# Patient Record
Sex: FEMALE | Race: WHITE | NOT HISPANIC OR LATINO | Employment: OTHER | URBAN - METROPOLITAN AREA
[De-identification: names, ages, dates, MRNs, and addresses within clinical notes are randomized per-mention and may not be internally consistent; named-entity substitution may affect disease eponyms.]

---

## 2017-03-08 ENCOUNTER — TELEPHONE (OUTPATIENT)
Dept: FAMILY MEDICINE CLINIC | Facility: CLINIC | Age: 52
End: 2017-03-08

## 2021-08-24 ENCOUNTER — HOSPITAL ENCOUNTER (INPATIENT)
Facility: HOSPITAL | Age: 56
End: 2021-08-24
Attending: PHYSICAL MEDICINE & REHABILITATION | Admitting: PHYSICAL MEDICINE & REHABILITATION

## 2021-12-08 LAB — PAP RECOMMENDATION EXT: NORMAL

## 2021-12-09 LAB — HPV, HIGH-RISK: NEGATIVE

## 2023-01-05 PROBLEM — K75.81 LIVER CIRRHOSIS SECONDARY TO NASH (NONALCOHOLIC STEATOHEPATITIS): Status: ACTIVE | Noted: 2023-01-05

## 2023-01-05 PROBLEM — R22.41 LOCALIZED SWELLING OF RIGHT FOOT: Status: ACTIVE | Noted: 2023-01-05

## 2023-01-05 PROBLEM — K74.60 LIVER CIRRHOSIS SECONDARY TO NASH (NONALCOHOLIC STEATOHEPATITIS): Status: ACTIVE | Noted: 2023-01-05

## 2023-01-11 PROBLEM — E03.9 HYPOTHYROIDISM: Status: ACTIVE | Noted: 2023-01-11

## 2023-01-11 PROBLEM — F32.A DEPRESSION: Status: ACTIVE | Noted: 2023-01-11

## 2023-01-11 PROBLEM — K76.82 HEPATIC ENCEPHALOPATHY: Status: ACTIVE | Noted: 2023-01-11

## 2023-02-28 PROBLEM — I85.00 ESOPHAGEAL VARICES: Status: ACTIVE | Noted: 2019-10-15

## 2023-04-18 DIAGNOSIS — Z12.11 ENCOUNTER FOR COLORECTAL CANCER SCREENING: Primary | ICD-10-CM

## 2023-04-18 DIAGNOSIS — Z12.12 ENCOUNTER FOR COLORECTAL CANCER SCREENING: Primary | ICD-10-CM

## 2023-05-08 PROBLEM — D68.9 COAGULOPATHY: Status: ACTIVE | Noted: 2023-05-08

## 2023-05-08 PROBLEM — D72.810 LYMPHOPENIA: Status: ACTIVE | Noted: 2023-05-08

## 2023-05-08 PROBLEM — D69.6 THROMBOCYTOPENIA: Status: ACTIVE | Noted: 2023-05-08

## 2023-10-05 ENCOUNTER — PATIENT OUTREACH (OUTPATIENT)
Dept: ADMINISTRATIVE | Facility: HOSPITAL | Age: 58
End: 2023-10-05

## 2023-10-17 PROBLEM — R18.8 OTHER ASCITES: Status: ACTIVE | Noted: 2023-10-17

## 2023-10-17 PROBLEM — K72.90 DECOMPENSATED HEPATIC CIRRHOSIS: Status: ACTIVE | Noted: 2023-10-17

## 2023-10-17 PROBLEM — K74.60 DECOMPENSATED HEPATIC CIRRHOSIS: Status: ACTIVE | Noted: 2023-10-17

## 2023-10-18 PROBLEM — I81 PORTAL VEIN THROMBOSIS: Status: ACTIVE | Noted: 2023-10-18

## 2023-10-19 ENCOUNTER — E-CONSULT (OUTPATIENT)
Dept: GASTROENTEROLOGY | Facility: HOSPITAL | Age: 58
End: 2023-10-19
Payer: MEDICAID

## 2023-10-19 ENCOUNTER — TELEPHONE (OUTPATIENT)
Dept: TRANSPLANT | Facility: CLINIC | Age: 58
End: 2023-10-19
Payer: MEDICAID

## 2023-10-19 DIAGNOSIS — I81 PORTAL VEIN THROMBOSIS: Primary | ICD-10-CM

## 2023-10-19 PROCEDURE — 99451 PR INTERPROF, PHONE/INTERNET/EHR, CONSULT, >= 5MINS: ICD-10-PCS | Mod: ,,, | Performed by: INTERNAL MEDICINE

## 2023-10-19 PROCEDURE — 99451 NTRPROF PH1/NTRNET/EHR 5/>: CPT | Mod: ,,, | Performed by: INTERNAL MEDICINE

## 2023-10-19 NOTE — CONSULTS
[facility] GASTROENTEROLOGY  Response for E-Consult     Patient Name: Patricia Hutchinson  MRN: [id number]  Primary Care Provider: Khloe Kiser MD   Requesting Provider: Hanh Gamboa MD  E-Consult to Liver Transplant  Consult performed by: Brendan Fu MD  Consult ordered by: Hanh Gamboa MD          Recommendation: DALEY with MELD 19 and PVT    Please get CT liver with contrast as I will discuss with Transplant team in Radiology conference before moving with Transplant evaluation.        Contingency:     Total time of Consultation: 10 minute    I did not speak to the requesting provider verbally about this.     *This eConsult is based on the clinical data available to me and is furnished without benefit of a physical examination. The eConsult will need to be interpreted in light of any clinical issues or changes in patient status not available to me at the time of filing this eConsults. Significant changes in patient condition or level of acuity should result in immediate formal consultation and reevaluation. Please alert me if you have further questions.    Thank you for this eConsult referral.     Brendan Fu MD  [facility] GASTROENTEROLOGY

## 2023-10-19 NOTE — TELEPHONE ENCOUNTER
Referral received from Dr JED GARRIDO consult  Initial  referral from Hanh Gamboa MD  Patient with DALEY  MELD 20  ICD-10:  k74.60       Referred for liver transplant for  EVALUATION.    Referral completed and forwarded to [facility].          Insurance: Epic   PRIMARY:   ID:  Contact #     SECONDARY:   ID:  Contact #

## 2023-10-24 ENCOUNTER — TELEPHONE (OUTPATIENT)
Dept: TRANSPLANT | Facility: CLINIC | Age: 58
End: 2023-10-24
Payer: MEDICAID

## 2023-10-24 NOTE — TELEPHONE ENCOUNTER
"Patient: Patricia Hutchinson       MRN: [id number]      : 1965     Age: 58 y.o.  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    Clinical Summary: inpatient at [facility].   Decomp palumbo cirrhosis p/w worsening ascites. Refractory ascites, not a candidate for tips in the past given PVT.     Melds score of 22- higher risk for complications post tips. She has also had decompensation in the past with EV bleed, possible SBP, jaundice and mild HE. She also had a complicated course with portal vein thrombosis in  (not on AC).   in   p. She was seen at by [facility] and was told she has a portal vein thrombosis and is "too high risk"     Imaging to be reviewed: 10/20/2023  CT abd TP Epic  complete thrombosed portal and splenic veins    HCC Treatment History: na    Platelets:   Lab Results   Component Value Date/Time    PLT 42 (LL) 10/24/2023 02:10 AM     Creatinine:   Lab Results   Component Value Date/Time    CREATININE 0.8 10/24/2023 02:10 AM     Bilirubin:   Lab Results   Component Value Date/Time    BILITOT 2.6 (H) 10/24/2023 02:10 AM     AFP Last 3 each if available: No results found for: "AFP", "EXTAFP"    MELD: MELD 3.0: 20 at 10/24/2023  2:10 AM  MELD-Na: 18 at 10/24/2023  2:10 AM  Calculated from:  Serum Creatinine: 0.8 mg/dL (Using min of 1 mg/dL) at 10/24/2023  2:10 AM  Serum Sodium: 134 mmol/L at 10/24/2023  2:10 AM  Total Bilirubin: 2.6 mg/dL at 10/24/2023  2:10 AM  Serum Albumin: 2.4 g/dL at 10/24/2023  2:10 AM  INR(ratio): 1.69 at 10/24/2023  2:10 AM  Age at listing (hypothetical): 58 years  Sex: Female at 10/24/2023  2:10 AM      Plan:     Follow-up Provider: Mela   "
Submitted for review at liver conference  on 10/31/23.  
This document is complete and the patient is ready for discharge.

## 2023-10-30 NOTE — TELEPHONE ENCOUNTER
"Patient: Patricia Hutchinson       MRN: [id number]      : 1965     Age: 58 y.o.  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     Clinical Summary: inpatient at [facility].   Decomp palumbo cirrhosis p/w worsening ascites. Refractory ascites, not a candidate for tips in the past given PVT.     Melds score of 22- higher risk for complications post tips. She has also had decompensation in the past with EV bleed, possible SBP, jaundice and mild HE. She also had a complicated course with portal vein thrombosis in  (not on AC).   in   p. [facility] was [facility] at by [facility] and was told she has a portal vein thrombosis and is "too high risk"      Imaging to be reviewed: 10/20/2023  CT abd TP Epic  complete thrombosed portal and splenic veins     HCC Treatment History: na     Platelets:         Lab Results   Component Value Date/Time     PLT 42 (LL) 10/24/2023 02:10 AM      Creatinine:         Lab Results   Component Value Date/Time     CREATININE 0.8 10/24/2023 02:10 AM      Bilirubin:         Lab Results   Component Value Date/Time     BILITOT 2.6 (H) 10/24/2023 02:10 AM      AFP Last 3 each if available: No results found for: "AFP", "EXTAFP"     MELD: MELD 3.0: 20 at 10/24/2023  2:10 AM  MELD-Na: 18 at 10/24/2023  2:10 AM  Calculated from:  Serum Creatinine: 0.8 mg/dL (Using min of 1 mg/dL) at 10/24/2023  2:10 AM  Serum Sodium: 134 mmol/L at 10/24/2023  2:10 AM  Total Bilirubin: 2.6 mg/dL at 10/24/2023  2:10 AM  Serum Albumin: 2.4 g/dL at 10/24/2023  2:10 AM  INR(ratio): 1.69 at 10/24/2023  2:10 AM  Age at listing (hypothetical): 58 years  Sex: Female at 10/24/2023  2:10 AM      "   Plan: CT demonstrates thrombosis of the portal and splenic veins, which is new from 3/2023.       Discussion:Img dated back from March.. there is contrast in portal vein and in the interim she has completely thrombose her portal as well as her splenic.   There is no portal access that we can see that's patent in the liver .  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   New from March No recanalization  option completely clotted.   No anticoagulation , it increases the risk of bleeding complications.    Note forwarded to Swati for follow up.       Follow-up Provider: Mela

## 2023-10-31 ENCOUNTER — DOCUMENTATION ONLY (OUTPATIENT)
Dept: HEPATOLOGY | Facility: HOSPITAL | Age: 58
End: 2023-10-31
Payer: MEDICAID

## 2023-10-31 ENCOUNTER — CONFERENCE (OUTPATIENT)
Dept: TRANSPLANT | Facility: CLINIC | Age: 58
End: 2023-10-31
Payer: MEDICAID

## 2023-10-31 NOTE — PROGRESS NOTES
Case discussed In IR conference. Patient is not a candidate for Liver Transplant due to complex surgical anatomy and extensive thrombosis of Portal system. No plan to initial the transplant evaluation. Unfortunately patient need to continue LVP to control fluid accumulation

## 2023-11-06 ENCOUNTER — TELEPHONE (OUTPATIENT)
Dept: HEPATOLOGY | Facility: CLINIC | Age: 58
End: 2023-11-06
Payer: MEDICAID

## 2023-11-06 NOTE — TELEPHONE ENCOUNTER
Spoke with pt to confirm her appt with Dr Fu on Wed, 11/08/23 at the [facility].  Pt asked me if she had a co-pay and I explained that at the end of her completing her E-Check the system will let her know if she will have on or not.  Pt stated that it did and it's $140 and went on to say that she has not income at this time.    I told pt unfortunately I do not have control of that.  This will need to be addressed by the billing/finance department and I provided pt with their contact information [phone].    Pt understood verbally and said that she will contact them.

## 2023-11-07 PROBLEM — K76.0 NON-ALCOHOLIC FATTY LIVER DISEASE: Status: ACTIVE | Noted: 2023-11-07

## 2023-11-07 PROBLEM — K76.6 PORTAL HYPERTENSION: Status: ACTIVE | Noted: 2023-11-07

## 2023-11-07 PROBLEM — R18.8 CIRRHOSIS OF LIVER WITH ASCITES: Status: ACTIVE | Noted: 2023-01-05

## 2023-11-07 NOTE — PROGRESS NOTES
"    Patricia Hutchinson   is a 58 y.o.. I performed this consultation using real-time Telehealth tools, including a live video connection between my location and the patient's location. Prior to initiating the consultation, I obtained informed verbal consent to perform this consultation using Telehealth tools and answered all the questions about the Telehealth interaction.      Each patient to whom he or she provides medical services by telemedicine is:  (1) informed of the relationship between the physician and patient and the respective role of any other health care provider with respect to management of the patient; and (2) notified that he or she may decline to receive medical services by telemedicine and may withdraw from such care at any time.      Hepatology Note    PATIENT: Patricia Hutchinson  MRN: [id number]  DATE: 11/10/2023    Provider: Hepatologist - Dr Fu  Urgency of review: non-urgent  Referring provider: Dr. Brendan Fu    Diagnosis:   1. Cirrhosis of liver with ascites, unspecified hepatic cirrhosis type    2. Secondary esophageal varices with bleeding    3. Hepatic encephalopathy    4. Other ascites    5. Portal hypertension    6. Non-alcoholic fatty liver disease    7. Thrombocytopenia    8. Coagulopathy    9. Hypothyroidism, unspecified type        Chief complaint: No chief complaint on file.      Subjective:    Initial History: Patricia Hutchinson is a 58 y.o. female who was referred to Hepatology Clinic for consultation of DALEY cirrhosis        Patient has MASH cirrhosis decompensated p/w with EV bleed, possible SBP, jaundice and mild HE and PVT. She is not a candidate for TIPS in the past given PVT.  Her MELD score has been 20-22.      She also had a complicated course with portal vein thrombosis in 2021 (not on AC).  She was seen at by  transplant and was told she has a portal vein thrombosis and is "too high risk"     Patient was discussed in IR conference at [facility] and she was not a candidate for " Transplant due to compelx vascular anatomy    Plan: CT demonstrates thrombosis of the portal and splenic veins, which is new from 3/2023. Discussion: there is contrast in portal vein and in the interim she has completely thrombose her portal as well as her splenic.   There is no portal access that we can see that's patent in the liver .  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 No anticoagulation , it increases the risk of bleeding complications.    Patient was admitted last month at [facility] for ascites. EGD completed on 10/18 , 4 varices banded. US liver with doppler completed, showed portal vein with no flow, concerning for portal vein thrombosis     11/10/2023    She denies recent hematemesis, coffee ground emesis, melena, hematochezia, jaundice, confusion, LE edema, abdominal distension.        Prior Relevant History:    She  denies hepatotoxic medication    Review of systems:  A review of 12+ systems was conducted with pertinent positive and negative findings documented in HPI with all other systems reviewed and negative.      PFSH:  Past medical, family, and social history reviewed as documented in chart with pertinent positive medical, family, and social history detailed in HPI.    Past Medical History:   Past Medical History:   Diagnosis Date    Stroke     Unspecified cirrhosis of liver        Past Surgical HIstory:   Past Surgical History:   Procedure Laterality Date    BREAST BIOPSY Right      SECTION      HERNIA REPAIR         Family History:   Family History   Problem Relation Age of Onset    COPD Mother     Lung cancer Mother     Stroke Mother      She has no known family history of liver disease.     Social History:  reports that she has never smoked. She has never been exposed to tobacco smoke. She has never used smokeless tobacco. She reports that she does not currently use drugs. She reports that she does not drink alcohol.    She has no significant history of Alcohol     She denies  history of IV drug use/Tatto  She  denies high-risk sexual contacts, no raw seafood, no sick contacts      Allergies:  Review of patient's allergies indicates:   Allergen Reactions    Latex     Latex, natural rubber      Rash       Medications:  Current Outpatient Medications   Medication Sig Dispense Refill    citalopram (CELEXA) 20 MG tablet Take 1 tablet (20 mg total) by mouth once daily. 60 tablet 3    furosemide (LASIX) 80 MG tablet Take 1 tablet (80 mg total) by mouth once daily. 60 tablet 0    gabapentin (NEURONTIN) 300 MG capsule Take 1 capsule (300 mg total) by mouth 2 (two) times daily. 30 capsule 3    lactulose (CHRONULAC) 20 gram/30 mL Soln Take 30 mLs (20 g total) by mouth 3 (three) times daily. 2700 mL 1    levothyroxine (SYNTHROID, LEVOTHROID) 175 MCG tablet Take 1 tablet (175 mcg total) by mouth before breakfast. 30 tablet 11    pantoprazole (PROTONIX) 40 MG tablet Take 1 tablet (40 mg total) by mouth 2 (two) times daily before meals. for 14 days 28 tablet 0    promethazine (PHENERGAN) 25 MG tablet Take 0.5 tablets (12.5 mg total) by mouth every 8 (eight) hours as needed for Nausea. 60 tablet 0    spironolactone (ALDACTONE) 100 MG tablet Take 2 tablets (200 mg total) by mouth every morning. 60 tablet 0    sucralfate (CARAFATE) 100 mg/mL suspension Take 10 mLs (1 g total) by mouth 4 (four) times daily before meals and nightly. 414 mL 2     No current facility-administered medications for this visit.       Review of Systems   Constitutional:  Negative for fatigue, fever and unexpected weight change.   HENT:  Negative for ear pain, nosebleeds and trouble swallowing.    Eyes:  Negative for discharge and redness.   Respiratory:  Negative for cough and shortness of breath.    Cardiovascular:  Negative for palpitations and leg swelling.   Gastrointestinal:  Negative for abdominal distention, abdominal pain, diarrhea and vomiting.   Endocrine: Negative for cold intolerance and polyuria.   Genitourinary:   Negative for flank pain and hematuria.   Musculoskeletal:  Negative for back pain.   Skin:  Negative for pallor.   Neurological:  Negative for seizures and headaches.   Hematological:  Does not bruise/bleed easily.   Psychiatric/Behavioral:  Negative for confusion and hallucinations.        MELD 3.0: 21 at 11/7/2023  4:13 PM  MELD-Na: 20 at 11/7/2023  4:13 PM  Calculated from:  Serum Creatinine: 0.9 mg/dL (Using min of 1 mg/dL) at 11/7/2023  4:13 PM  Serum Sodium: 136 mmol/L at 11/7/2023  4:13 PM  Total Bilirubin: 4.5 mg/dL at 11/7/2023  4:13 PM  Serum Albumin: 3.4 g/dL at 11/7/2023  4:13 PM  INR(ratio): 1.9 at 11/7/2023  4:13 PM  Age at listing (hypothetical): 58 years  Sex: Female at 11/7/2023  4:13 PM       Objective:      Vitals: There were no vitals filed for this visit.    Physical Exam  Constitutional:       Appearance: Normal appearance.   HENT:      Head: Normocephalic and atraumatic.      Right Ear: External ear normal.      Left Ear: External ear normal.      Mouth/Throat:      Mouth: Mucous membranes are moist.   Eyes:      Extraocular Movements: Extraocular movements intact.      Pupils: Pupils are equal, round, and reactive to light.   Cardiovascular:      Rate and Rhythm: Normal rate.   Pulmonary:      Effort: Pulmonary effort is normal.   Abdominal:      General: There is no distension.      Palpations: There is no mass.      Tenderness: There is no abdominal tenderness.   Musculoskeletal:         General: Normal range of motion.      Cervical back: Normal range of motion.   Skin:     General: Skin is warm.   Neurological:      General: No focal deficit present.      Mental Status: She is alert and oriented to person, place, and time.         Laboratory Data:  Lab Visit on 11/07/2023   Component Date Value Ref Range Status    WBC 11/07/2023 5.71  3.90 - 12.70 K/uL Final    RBC 11/07/2023 3.72 (L)  4.00 - 5.40 M/uL Final    Hemoglobin 11/07/2023 12.2  12.0 - 16.0 g/dL Final    Hematocrit 11/07/2023  36.3 (L)  37.0 - 48.5 % Final    MCV 11/07/2023 98  82 - 98 fL Final    MCH 11/07/2023 32.8 (H)  27.0 - 31.0 pg Final    MCHC 11/07/2023 33.6  32.0 - 36.0 g/dL Final    RDW 11/07/2023 16.5 (H)  11.5 - 14.5 % Final    Platelets 11/07/2023 44 (L)  150 - 450 K/uL Final    MPV 11/07/2023 10.7  9.2 - 12.9 fL Final    Immature Granulocytes 11/07/2023 0.2  0.0 - 0.5 % Final    Gran # (ANC) 11/07/2023 4.6  1.8 - 7.7 K/uL Final    Immature Grans (Abs) 11/07/2023 0.01  0.00 - 0.04 K/uL Final    Comment: Mild elevation in immature granulocytes is non specific and   can be seen in a variety of conditions including stress response,   acute inflammation, trauma and pregnancy. Correlation with other   laboratory and clinical findings is essential.      Lymph # 11/07/2023 0.4 (L)  1.0 - 4.8 K/uL Final    Mono # 11/07/2023 0.6  0.3 - 1.0 K/uL Final    Eos # 11/07/2023 0.1  0.0 - 0.5 K/uL Final    Baso # 11/07/2023 0.03  0.00 - 0.20 K/uL Final    nRBC 11/07/2023 0  0 /100 WBC Final    Gran % 11/07/2023 81.0 (H)  38.0 - 73.0 % Final    Lymph % 11/07/2023 7.2 (L)  18.0 - 48.0 % Final    Mono % 11/07/2023 10.0  4.0 - 15.0 % Final    Eosinophil % 11/07/2023 1.1  0.0 - 8.0 % Final    Basophil % 11/07/2023 0.5  0.0 - 1.9 % Final    Differential Method 11/07/2023 Automated   Final    Sodium 11/07/2023 136  136 - 145 mmol/L Final    Potassium 11/07/2023 4.0  3.5 - 5.1 mmol/L Final    Chloride 11/07/2023 98  95 - 110 mmol/L Final    CO2 11/07/2023 29  23 - 29 mmol/L Final    Glucose 11/07/2023 128 (H)  70 - 110 mg/dL Final    BUN 11/07/2023 16  6 - 20 mg/dL Final    Creatinine 11/07/2023 0.9  0.5 - 1.4 mg/dL Final    Calcium 11/07/2023 9.3  8.7 - 10.5 mg/dL Final    Total Protein 11/07/2023 7.3  6.0 - 8.4 g/dL Final    Albumin 11/07/2023 3.4 (L)  3.5 - 5.2 g/dL Final    Total Bilirubin 11/07/2023 4.5 (H)  0.1 - 1.0 mg/dL Final    Comment: For infants and newborns, interpretation of results should   be based on gestational age, weight and in  agreement   with clinical observations.    Premature Infant recommended reference ranges:  Up to 24 hours.............<8.0 mg/dL  Up to 48 hours............<12.0 mg/dL  3-5 days..................<15.0 mg/dL  6-29 days.................<15.0 mg/dL      Alkaline Phosphatase 11/07/2023 103  55 - 135 U/L Final    AST 11/07/2023 35  10 - 40 U/L Final    ALT 11/07/2023 21  10 - 44 U/L Final    eGFR 11/07/2023 >60  >60 mL/min/1.73 m^2 Final    Anion Gap 11/07/2023 9  8 - 16 mmol/L Final    Cholesterol 11/07/2023 148  120 - 199 mg/dL Final    Comment: The National Cholesterol Education Program (NCEP) has set the  following guidelines (reference ranges) for Cholesterol:  Optimal.....................<200 mg/dL  Borderline High.............200-239 mg/dL  High........................> or = 240 mg/dL      Triglycerides 11/07/2023 78  30 - 150 mg/dL Final    Comment: The National Cholesterol Education Program (NCEP) has set the  following guidelines (reference values) for triglycerides:  Normal......................<150 mg/dL  Borderline High.............150-199 mg/dL  High........................200-499 mg/dL      HDL 11/07/2023 38 (L)  40 - 75 mg/dL Final    Comment: The National Cholesterol Education Program (NCEP) has set the  following guidelines (reference values) for HDL Cholesterol:  Low...............<40 mg/dL  Optimal...........>60 mg/dL      LDL Cholesterol 11/07/2023 93  63 - 159 mg/dL Final    Comment: The National Cholesterol Education Program (NCEP) has set the  following guidelines (reference values) for LDL Cholesterol:  Optimal.......................<130 mg/dL  Borderline High...............130-159 mg/dL  High..........................160-189 mg/dL  Very High.....................>190 mg/dL      HDL/Cholesterol Ratio 11/07/2023 25.7  20.0 - 50.0 % Final    Total Cholesterol/HDL Ratio 11/07/2023 3.9  2.0 - 5.0 Final    Non-HDL Cholesterol 11/07/2023 110  mg/dL Final    Comment: Risk category and Non-HDL cholesterol  goals:  Coronary heart disease (CHD)or equivalent (10-year risk of CHD >20%):  Non-HDL cholesterol goal     <130 mg/dL  Two or more CHD risk factors and 10-year risk of CHD <= 20%:  Non-HDL cholesterol goal     <160 mg/dL  0 to 1 CHD risk factor:  Non-HDL cholesterol goal     <190 mg/dL      Prothrombin Time 11/07/2023 21.3 (H)  9.0 - 12.5 sec Final    INR 11/07/2023 1.9 (H)  0.8 - 1.2 Final    Comment: Coumadin Therapeutic Range:  Thrombosis: 2.0-3.0   Most mechanical heart valves or recurrent embolism: 2.5 - 3.5     For vitamin K antagonists (eg. Warfarin or Coumadin), the PT/INR is   recommended.    Direct oral anticoagulant medications (non-vitamin K) must not be   monitored with PT/INR or aPTT since the effect of these tests is not  predictable.      TSH 11/07/2023 43.821 (H)  0.400 - 4.000 uIU/mL Final    Free T4 11/07/2023 1.29  0.71 - 1.51 ng/dL Final   Office Visit on 11/07/2023   Component Date Value Ref Range Status    POC Rapid COVID 11/07/2023 Negative  Negative Final     Acceptable 11/07/2023 Yes   Final   Hospital Outpatient Visit on 11/03/2023   Component Date Value Ref Range Status    WBC 11/03/2023 3.98  3.90 - 12.70 K/uL Final    RBC 11/03/2023 3.67 (L)  4.00 - 5.40 M/uL Final    Hemoglobin 11/03/2023 11.7  10.9 - 14.3 g/dL Final    Hematocrit 11/03/2023 35.2  32.0 - 45.4 % Final    MCV 11/03/2023 96  82 - 98 fL Final    MCH 11/03/2023 31.9 (H)  27.0 - 31.0 pg Final    MCHC 11/03/2023 33.2  29.6 - 33.8 g/dL Final    RDW 11/03/2023 16.5 (H)  11.5 - 14.5 % Final    Platelets 11/03/2023 60 (L)  150 - 450 K/uL Final    MPV 11/03/2023 10.4  9.2 - 12.9 fL Final    Immature Granulocytes 11/03/2023 0.3  0.0 - 0.5 % Final    Gran # (ANC) 11/03/2023 2.8  1.8 - 7.7 K/uL Final    Immature Grans (Abs) 11/03/2023 0.01  0.00 - 0.04 K/uL Final    Comment: Mild elevation in immature granulocytes is non specific and   can be seen in a variety of conditions including stress response,   acute  "inflammation, trauma and pregnancy. Correlation with other   laboratory and clinical findings is essential.      Lymph # 11/03/2023 0.6 (L)  1.0 - 4.8 K/uL Final    Mono # 11/03/2023 0.5  0.3 - 1.0 K/uL Final    Eos # 11/03/2023 0.1  0.0 - 0.5 K/uL Final    Baso # 11/03/2023 0.03  0.00 - 0.20 K/uL Final    nRBC 11/03/2023 0  0 /100 WBC Final    Gran % 11/03/2023 70.7  38.0 - 73.0 % Final    Lymph % 11/03/2023 14.6 (L)  18.0 - 48.0 % Final    Mono % 11/03/2023 11.8  4.0 - 15.0 % Final    Eosinophil % 11/03/2023 1.8  0.0 - 8.0 % Final    Basophil % 11/03/2023 0.8  0.0 - 1.9 % Final    Differential Method 11/03/2023 Automated   Final    Prothrombin Time 11/03/2023 19.1 (H)  9.4 - 12.5 sec Final    INR 11/03/2023 1.66   Final    Comment: Coumadin Therapy:  2.0 - 3.0 for INR for all indicators except mechanical heart valves  and antiphospholipid syndromes which should use 2.5 - 3.5.         Lab Results   Component Value Date    INR 1.9 (H) 11/07/2023    INR 1.66 11/03/2023    INR 1.69 10/24/2023       No results found for: "SMOOTHMUSCAB", "MITOAB"  Lab Results   Component Value Date    IRON 117 03/28/2023    TIBC 311 03/28/2023    FERRITIN 63 03/28/2023     Lab Results   Component Value Date    HEPAIGM Non-reactive 10/16/2023    HEPBIGM Non-reactive 10/16/2023    HEPCAB Non-reactive 10/16/2023     Lab Results   Component Value Date    TSH 43.821 (H) 11/07/2023     No results found for: "WENDY"    No results found for: "ABORH"        Lab Results   Component Value Date    HGBA1C 4.2 01/05/2023     Lab Results   Component Value Date    CHOL 148 11/07/2023    CHOL 181 01/05/2023     Lab Results   Component Value Date    HDL 38 (L) 11/07/2023    HDL 35 (L) 01/05/2023     Lab Results   Component Value Date    LDLCALC 93 11/07/2023    LDLCALC 95 01/05/2023     Lab Results   Component Value Date    TRIG 78 11/07/2023    TRIG 220 (H) 01/05/2023     Lab Results   Component Value Date    CHOLHDL 25.7 11/07/2023    CHOLHDL 19.3 (L) " 01/05/2023         I personally reviewed imaging studies and outside records..      Assessment:       1. Cirrhosis of liver with ascites, unspecified hepatic cirrhosis type    2. Secondary esophageal varices with bleeding    3. Hepatic encephalopathy    4. Other ascites    5. Portal hypertension    6. Non-alcoholic fatty liver disease    7. Thrombocytopenia    8. Coagulopathy    9. Hypothyroidism, unspecified type                 Plan:     Diagnoses and all orders for this visit:    Cirrhosis of liver with ascites, unspecified hepatic cirrhosis type    Secondary esophageal varices with bleeding    Hepatic encephalopathy    Other ascites    Portal hypertension    Non-alcoholic fatty liver disease    Thrombocytopenia    Coagulopathy    Hypothyroidism, unspecified type              --Cirrhosis    Liver disease:                   DALEY    MELD-Na:                         18  Child-Villegas Class:             C    Cirrhosis is decompensated with:    Ascites:                                                      yes  Spontaneous bacterial peritonitis:              yes  Hepatic Encephalopathy:                           yes  Portal bleeding:                                          yes  Hepatocellular carcinoma:                          no    Hepatorenal syndrome:                              no  Hyponatremia:                                            no  Muscle wasting:                                          yes   Portal vein thrombosis:                               yes      Transplant status:             not under evaluation  Complex surgical vascular anatomy       --Ascites/Edema: 2 gm Na diet. Continue Lasix 80 mg daily and Aldactone 200 mg daily.     - Encephalopathy: Continue lactulose. Titrate to maintain 3-4 bowel movements per day..        - Varices: EGD surveillance as per GI recommendation. Beta blockers yes    -Cirrhosis Care    - HCC screening:  Abdominal US and AFP for HCC surveillance every 6 months.  -  Immunizations: Recommend HAV and HBV vaccinations if not immune.  - Dexa Scan every 2-3 years    Rest Health Maintenance as per Primary care Physician    Because you have cirrhosis, it is important to attend regular clinic visits  with an Ultrasound and blood tests every 6 months to screen for liver cancer (you are at risk of developing liver cancer due to scar tissue in the liver)     Signs and symptoms of worsening liver disease include jaundice, fluid in the belly (ascites), and confusion/disorientation/slowed thought processes due to hepatic encephalopathy (toxins building up because of liver problems).   You should seek medical attention if any of these things occur.    Also, possible bleeding from esophageal varices (blood vessels in the stomach and foodpipe can burst and cause fatal bleeding).  Therefore, if you have symptoms of vomiting blood, blood in your stool, dark or black stools or vomiting coffee ground vomit, YOU SHOULD GO TO THE EMERGENCY ROOM IMMEDIATELY.      Cirrhosis can increase the risk of liver cancer, liver failure, and death. However, we will watch your liver function score (MELD score) closely with each clinic visit. A normal MELD score is 6, highest is 40. We will check this with every clinic visit. A MELD 15 or higher is when we start to consider transplant because MELD 15 or higher indicates that the liver is not functioning as well        Cirrhosis Counseling  - strict abstinence of alcohol use  - compliance with lactulose and rifaximin if you have developed hepatic encephalopathy (confusion due to high ammonia level)  - avoid non-steroidal anti-inflammatory drugs (NSAIDs) such as ibuprofen, Motrin, naprosyn, Alleve due to the risk of kidney damage  - can take acetaminophen (Tylenol), no more than 2000 mg per day  - low sodium (salt) 2 gram per day diet  - nutrition: 25-30kcal (calorie per body body weight in kilogram) per day  - no need to restrict protein in diet  - high protein  diet: 1.2-1.5 gram/kg (protein per body weight in kilogram) per day to prevent muscle mass loss  - avoid fasting  -I recommend you do NOT drive a car or operate machinery currently considering risk of Hepatic encephalopathy  - Late night snack with 50 gram of complex carbohydrates and protein  - resistance exercises for muscle strength  - avoid raw seafoods due to the risk of fatal Vibrio vulnificus infection  - ultrasound or imaging of the liver every 6 months for liver cancer screening (you are at risk of developing liver cancer due to scar tissue in the liver)  - Upper endoscopy every 1-2 years to screen for varices in the stomach and foodpipe which can burst and cause fatal bleeding    Return to clinic in 6 months. Follow up [facility]    I have sent communication to the referring physician and/or primary care provider.      Time Statement  A total time spent includes time preparing to see patient, reviewing  diagnostic studies and records, direct face-to-face visit, completing orders, medications , reconciliation, prescription management, and care coordination    We discussed in depth the nature of the patient's disease, the management plan in details. I have provided the patient with an opportunity to ask questions and have all questions answered to his satisfaction.     Discussed with patient that it is likely that she will see results before Myself or my nurse are able to view them and report results due to the Cures Act passed 4/1/21. Results will be sent immediately to the patient who are enrolled in the patient portal. If results come through after business hours or on weekend, we will not see them until the next business day that we are in the office. If resulted during the business day, we will likely not be able to review them until after completing all patient visits in office that day.   Patient was seen in the liver transplant department at [facility] Debbie Fu MD  Transplant  Hepatologist and Gastroenterologist  [facility] [place]

## 2023-11-08 ENCOUNTER — TELEPHONE (OUTPATIENT)
Dept: HEPATOLOGY | Facility: CLINIC | Age: 58
End: 2023-11-08

## 2023-11-08 ENCOUNTER — OFFICE VISIT (OUTPATIENT)
Dept: HEPATOLOGY | Facility: CLINIC | Age: 58
End: 2023-11-08
Payer: MEDICAID

## 2023-11-08 ENCOUNTER — TELEPHONE (OUTPATIENT)
Dept: HEPATOLOGY | Facility: CLINIC | Age: 58
End: 2023-11-08
Payer: MEDICAID

## 2023-11-08 DIAGNOSIS — K76.6 PORTAL HYPERTENSION: ICD-10-CM

## 2023-11-08 DIAGNOSIS — K76.0 NON-ALCOHOLIC FATTY LIVER DISEASE: ICD-10-CM

## 2023-11-08 DIAGNOSIS — D68.9 COAGULOPATHY: ICD-10-CM

## 2023-11-08 DIAGNOSIS — K74.60 CIRRHOSIS OF LIVER WITH ASCITES, UNSPECIFIED HEPATIC CIRRHOSIS TYPE: Primary | ICD-10-CM

## 2023-11-08 DIAGNOSIS — R18.8 CIRRHOSIS OF LIVER WITH ASCITES, UNSPECIFIED HEPATIC CIRRHOSIS TYPE: Primary | ICD-10-CM

## 2023-11-08 DIAGNOSIS — K76.82 HEPATIC ENCEPHALOPATHY: ICD-10-CM

## 2023-11-08 DIAGNOSIS — E03.9 HYPOTHYROIDISM, UNSPECIFIED TYPE: ICD-10-CM

## 2023-11-08 DIAGNOSIS — D69.6 THROMBOCYTOPENIA: ICD-10-CM

## 2023-11-08 DIAGNOSIS — R18.8 OTHER ASCITES: ICD-10-CM

## 2023-11-08 DIAGNOSIS — I85.11 SECONDARY ESOPHAGEAL VARICES WITH BLEEDING: ICD-10-CM

## 2023-11-08 PROCEDURE — 3044F HG A1C LEVEL LT 7.0%: CPT | Mod: CPTII,95,, | Performed by: INTERNAL MEDICINE

## 2023-11-08 PROCEDURE — 1159F PR MEDICATION LIST DOCUMENTED IN MEDICAL RECORD: ICD-10-PCS | Mod: CPTII,95,, | Performed by: INTERNAL MEDICINE

## 2023-11-08 PROCEDURE — 1160F PR REVIEW ALL MEDS BY PRESCRIBER/CLIN PHARMACIST DOCUMENTED: ICD-10-PCS | Mod: CPTII,95,, | Performed by: INTERNAL MEDICINE

## 2023-11-08 PROCEDURE — 1159F MED LIST DOCD IN RCRD: CPT | Mod: CPTII,95,, | Performed by: INTERNAL MEDICINE

## 2023-11-08 PROCEDURE — 3044F PR MOST RECENT HEMOGLOBIN A1C LEVEL <7.0%: ICD-10-PCS | Mod: CPTII,95,, | Performed by: INTERNAL MEDICINE

## 2023-11-08 PROCEDURE — 99215 PR OFFICE/OUTPT VISIT, EST, LEVL V, 40-54 MIN: ICD-10-PCS | Mod: 95,,, | Performed by: INTERNAL MEDICINE

## 2023-11-08 PROCEDURE — 99215 OFFICE O/P EST HI 40 MIN: CPT | Mod: 95,,, | Performed by: INTERNAL MEDICINE

## 2023-11-08 PROCEDURE — 1160F RVW MEDS BY RX/DR IN RCRD: CPT | Mod: CPTII,95,, | Performed by: INTERNAL MEDICINE

## 2023-11-08 NOTE — TELEPHONE ENCOUNTER
"Spoke with pt sister Christine that her appt has been rescheduled with Dr Fu on 11/15/23 @ 0800 for another virtual visit.    I did ex[ain to Christine to log on at 0745 and completed the "Pre-Echeck" prior to th visit and once that part is completed the "JOIN VISIT" will turn "Green" and then click that to be connected.     Christine understood and accepted appt date and time.  "

## 2023-11-08 NOTE — TELEPHONE ENCOUNTER
----- Message from Erica Arana MA sent at 11/8/2023  3:11 PM CST -----  Regarding: RE: Reschedule Appointment  Ok she should be unarrived.    ----- Message -----  From: Megan Pozo RN  Sent: 11/8/2023   3:09 PM CST  To: Erica Arana MA  Subject: RE: Reschedule Appointment                       It won't let me schedule her appointment because it is saying that she is still arrived for the appointment today.  ----- Message -----  From: Erica Arana MA  Sent: 11/8/2023   3:08 PM CST  To: Megan Pozo RN  Subject: RE: Reschedule Appointment                       She is a Transplant eval MELD 19.  I would say as soon as possible.    thanks      ----- Message -----  From: Megan Pozo RN  Sent: 11/8/2023   2:57 PM CST  To: Erica Arana MA  Subject: RE: Reschedule Appointment                       In what timeframe does the patient need to be seen?  ----- Message -----  From: Erica Arana MA  Sent: 11/8/2023  11:48 AM CST  To: [facility] Hepatology Coordinator  Subject: Reschedule Appointment                           Megan Guzman Pt logged in at 11:30 and therefore missed her appt because Dr Fu waiting til 11:15 and left to end clinic.    Can you see if there is anything else available?    Thank you,    Erica

## 2023-11-14 NOTE — PROGRESS NOTES
Patricia Hutchinson   is a 58 y.o.. I performed this consultation using real-time Telehealth tools, including a live video connection between my location and the patient's location. Prior to initiating the consultation, I obtained informed verbal consent to perform this consultation using Telehealth tools and answered all the questions about the Telehealth interaction.      Each patient to whom he or she provides medical services by telemedicine is:  (1) informed of the relationship between the physician and patient and the respective role of any other health care provider with respect to management of the patient; and (2) notified that he or she may decline to receive medical services by telemedicine and may withdraw from such care at any time.      Hepatology Note    PATIENT: Patricia Hutchinson  MRN: [id number]  DATE: 2023    Provider: Hepatologist - Dr Fu  Urgency of review: non-urgent  Referring provider: Dr. Brendan Fu    Diagnosis:   1. Cirrhosis of liver with ascites, unspecified hepatic cirrhosis type    2. Secondary esophageal varices with bleeding    3. Hepatic encephalopathy    4. Other ascites    5. Portal hypertension    6. Non-alcoholic fatty liver disease    7. Thrombocytopenia    8. Coagulopathy    9. Hypothyroidism, unspecified type          Chief complaint: No chief complaint on file.      Subjective:    Initial History: Patricia Hutchinson is a 58 y.o. female who was referred to Hepatology Clinic for consultation of [facility] cirrhosis       Diagnosed with cirrhosis: diagnosed  decompensated                Ascites: lasix 80, spironolactone 200 daily; monthly LVP, ? SBP.                HE: on lactulose               GI bleeding: yes Beta blockers: no              Jaundice: none     Cirrhosis HCM:  HCC screening: US   Variceal screenin s/p EVL  Dexa scan:no      Patient has [facility] cirrhosis decompensated p/w with EV bleed, possible SBP ( no documented), mild HE and extensive PVT.  Her  "MELD score has been 20-22.  She also had a complicated course with portal vein thrombosis in 2021 (not on AC).  She is not a candidate for TIPS in the past given PVT. She was seen at by  transplant and was told she has a portal vein thrombosis and is "too high risk"     Patient was referred for transplant evaluation at [facility] and discussed in IR Transplant conference at [facility] before initialing any evaluation. Unfortunately, per team discussion her surgical anatomy is too complex due to thrombosed Portal vein and she is not a candidate for Transplant due to compelx vascular anatomy at [facility]  Conference note:   Plan: CT demonstrates thrombosis of the portal and splenic veins, which is new from 3/2023. Discussion: there is contrast in portal vein and in the interim she has completely thrombose her portal as well as her splenic.   There is no portal access that we can see that's patent in the liver .  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 No anticoagulation , it increases the risk of bleeding complications.      11/14/2023  Patient is on diuretics but complaining of abdominal distention. She also mentions remote history of stroke but not able to remember details  She denies recent hematemesis, coffee ground emesis, melena, hematochezia, jaundice, confusion, LE edema    Patient was admitted last month at [facility] for ascites. EGD completed on 10/18 , 4 varices banded. US liver with doppler completed, showed portal vein with no flow,     Prior Relevant History:    She  denies hepatotoxic medication    Review of systems:  A review of 12+ systems was conducted with pertinent positive and negative findings documented in HPI with all other systems reviewed and negative.      PFSH:  Past medical, family, and social history reviewed as documented in chart with pertinent positive medical, family, and social history detailed in HPI.    Past Medical History:   Past Medical History:   Diagnosis Date    Stroke     " Unspecified cirrhosis of liver        Past Surgical HIstory:   Past Surgical History:   Procedure Laterality Date    BREAST BIOPSY Right      SECTION      HERNIA REPAIR         Family History:   Family History   Problem Relation Age of Onset    COPD Mother     Lung cancer Mother     Stroke Mother      She has no known family history of liver disease.     Social History:  reports that she has never smoked. She has never been exposed to tobacco smoke. She has never used smokeless tobacco. She reports that she does not currently use drugs. She reports that she does not drink alcohol.    She has no significant history of Alcohol     She denies history of IV drug use/Tatto  She  denies high-risk sexual contacts, no raw seafood, no sick contacts      Allergies:  Review of patient's allergies indicates:   Allergen Reactions    Latex     Latex, natural rubber      Rash       Medications:  Current Outpatient Medications   Medication Sig Dispense Refill    citalopram (CELEXA) 20 MG tablet Take 1 tablet (20 mg total) by mouth once daily. 60 tablet 3    furosemide (LASIX) 80 MG tablet Take 1 tablet (80 mg total) by mouth once daily. 60 tablet 0    gabapentin (NEURONTIN) 300 MG capsule Take 1 capsule (300 mg total) by mouth 2 (two) times daily. 30 capsule 3    lactulose (CHRONULAC) 20 gram/30 mL Soln Take 30 mLs (20 g total) by mouth 3 (three) times daily. 2700 mL 1    levothyroxine (SYNTHROID, LEVOTHROID) 175 MCG tablet Take 1 tablet (175 mcg total) by mouth before breakfast. 30 tablet 11    pantoprazole (PROTONIX) 40 MG tablet Take 1 tablet (40 mg total) by mouth 2 (two) times daily before meals. for 14 days 28 tablet 0    promethazine (PHENERGAN) 25 MG tablet Take 0.5 tablets (12.5 mg total) by mouth every 8 (eight) hours as needed for Nausea. 60 tablet 0    spironolactone (ALDACTONE) 100 MG tablet Take 2 tablets (200 mg total) by mouth every morning. 60 tablet 0    sucralfate (CARAFATE) 100 mg/mL suspension Take 10  mLs (1 g total) by mouth 4 (four) times daily before meals and nightly. 414 mL 2     No current facility-administered medications for this visit.       Review of Systems   Constitutional:  Positive for fatigue. Negative for fever and unexpected weight change.   HENT:  Negative for ear pain, nosebleeds and trouble swallowing.    Eyes:  Negative for discharge and redness.   Respiratory:  Negative for cough and shortness of breath.    Cardiovascular:  Positive for leg swelling. Negative for palpitations.   Gastrointestinal:  Positive for abdominal distention. Negative for abdominal pain, diarrhea and vomiting.   Endocrine: Negative for cold intolerance and polyuria.   Genitourinary:  Negative for flank pain and hematuria.   Musculoskeletal:  Negative for back pain.   Skin:  Negative for pallor.   Neurological:  Negative for seizures and headaches.   Hematological:  Does not bruise/bleed easily.   Psychiatric/Behavioral:  Negative for confusion and hallucinations.        MELD 3.0: 21 at 11/7/2023  4:13 PM  MELD-Na: 20 at 11/7/2023  4:13 PM  Calculated from:  Serum Creatinine: 0.9 mg/dL (Using min of 1 mg/dL) at 11/7/2023  4:13 PM  Serum Sodium: 136 mmol/L at 11/7/2023  4:13 PM  Total Bilirubin: 4.5 mg/dL at 11/7/2023  4:13 PM  Serum Albumin: 3.4 g/dL at 11/7/2023  4:13 PM  INR(ratio): 1.9 at 11/7/2023  4:13 PM  Age at listing (hypothetical): 58 years  Sex: Female at 11/7/2023  4:13 PM       Objective:      Vitals: There were no vitals filed for this visit.    Physical Exam  Constitutional:       Appearance: Normal appearance.   HENT:      Head: Normocephalic and atraumatic.      Right Ear: External ear normal.      Left Ear: External ear normal.      Mouth/Throat:      Mouth: Mucous membranes are moist.   Eyes:      Extraocular Movements: Extraocular movements intact.      Pupils: Pupils are equal, round, and reactive to light.   Cardiovascular:      Rate and Rhythm: Normal rate.   Pulmonary:      Effort: Pulmonary  effort is normal.   Abdominal:      General: There is distension.      Palpations: There is no mass.      Tenderness: There is no abdominal tenderness.   Musculoskeletal:         General: Normal range of motion.      Cervical back: Normal range of motion.   Skin:     General: Skin is warm.      Findings: Bruising present.   Neurological:      General: No focal deficit present.      Mental Status: She is alert and oriented to person, place, and time.         Laboratory Data:  No visits with results within 1 Week(s) from this visit.   Latest known visit with results is:   Lab Visit on 11/07/2023   Component Date Value Ref Range Status    WBC 11/07/2023 5.71  3.90 - 12.70 K/uL Final    RBC 11/07/2023 3.72 (L)  4.00 - 5.40 M/uL Final    Hemoglobin 11/07/2023 12.2  12.0 - 16.0 g/dL Final    Hematocrit 11/07/2023 36.3 (L)  37.0 - 48.5 % Final    MCV 11/07/2023 98  82 - 98 fL Final    MCH 11/07/2023 32.8 (H)  27.0 - 31.0 pg Final    MCHC 11/07/2023 33.6  32.0 - 36.0 g/dL Final    RDW 11/07/2023 16.5 (H)  11.5 - 14.5 % Final    Platelets 11/07/2023 44 (L)  150 - 450 K/uL Final    MPV 11/07/2023 10.7  9.2 - 12.9 fL Final    Immature Granulocytes 11/07/2023 0.2  0.0 - 0.5 % Final    Gran # (ANC) 11/07/2023 4.6  1.8 - 7.7 K/uL Final    Immature Grans (Abs) 11/07/2023 0.01  0.00 - 0.04 K/uL Final    Comment: Mild elevation in immature granulocytes is non specific and   can be seen in a variety of conditions including stress response,   acute inflammation, trauma and pregnancy. Correlation with other   laboratory and clinical findings is essential.      Lymph # 11/07/2023 0.4 (L)  1.0 - 4.8 K/uL Final    Mono # 11/07/2023 0.6  0.3 - 1.0 K/uL Final    Eos # 11/07/2023 0.1  0.0 - 0.5 K/uL Final    Baso # 11/07/2023 0.03  0.00 - 0.20 K/uL Final    nRBC 11/07/2023 0  0 /100 WBC Final    Gran % 11/07/2023 81.0 (H)  38.0 - 73.0 % Final    Lymph % 11/07/2023 7.2 (L)  18.0 - 48.0 % Final    Mono % 11/07/2023 10.0  4.0 - 15.0 % Final     Eosinophil % 11/07/2023 1.1  0.0 - 8.0 % Final    Basophil % 11/07/2023 0.5  0.0 - 1.9 % Final    Differential Method 11/07/2023 Automated   Final    Sodium 11/07/2023 136  136 - 145 mmol/L Final    Potassium 11/07/2023 4.0  3.5 - 5.1 mmol/L Final    Chloride 11/07/2023 98  95 - 110 mmol/L Final    CO2 11/07/2023 29  23 - 29 mmol/L Final    Glucose 11/07/2023 128 (H)  70 - 110 mg/dL Final    BUN 11/07/2023 16  6 - 20 mg/dL Final    Creatinine 11/07/2023 0.9  0.5 - 1.4 mg/dL Final    Calcium 11/07/2023 9.3  8.7 - 10.5 mg/dL Final    Total Protein 11/07/2023 7.3  6.0 - 8.4 g/dL Final    Albumin 11/07/2023 3.4 (L)  3.5 - 5.2 g/dL Final    Total Bilirubin 11/07/2023 4.5 (H)  0.1 - 1.0 mg/dL Final    Comment: For infants and newborns, interpretation of results should   be based on gestational age, weight and in agreement   with clinical observations.    Premature Infant recommended reference ranges:  Up to 24 hours.............<8.0 mg/dL  Up to 48 hours............<12.0 mg/dL  3-5 days..................<15.0 mg/dL  6-29 days.................<15.0 mg/dL      Alkaline Phosphatase 11/07/2023 103  55 - 135 U/L Final    AST 11/07/2023 35  10 - 40 U/L Final    ALT 11/07/2023 21  10 - 44 U/L Final    eGFR 11/07/2023 >60  >60 mL/min/1.73 m^2 Final    Anion Gap 11/07/2023 9  8 - 16 mmol/L Final    Cholesterol 11/07/2023 148  120 - 199 mg/dL Final    Comment: The National Cholesterol Education Program (NCEP) has set the  following guidelines (reference ranges) for Cholesterol:  Optimal.....................<200 mg/dL  Borderline High.............200-239 mg/dL  High........................> or = 240 mg/dL      Triglycerides 11/07/2023 78  30 - 150 mg/dL Final    Comment: The National Cholesterol Education Program (NCEP) has set the  following guidelines (reference values) for triglycerides:  Normal......................<150 mg/dL  Borderline High.............150-199 mg/dL  High........................200-499 mg/dL      HDL  "11/07/2023 38 (L)  40 - 75 mg/dL Final    Comment: The National Cholesterol Education Program (NCEP) has set the  following guidelines (reference values) for HDL Cholesterol:  Low...............<40 mg/dL  Optimal...........>60 mg/dL      LDL Cholesterol 11/07/2023 93  63 - 159 mg/dL Final    Comment: The National Cholesterol Education Program (NCEP) has set the  following guidelines (reference values) for LDL Cholesterol:  Optimal.......................<130 mg/dL  Borderline High...............130-159 mg/dL  High..........................160-189 mg/dL  Very High.....................>190 mg/dL      HDL/Cholesterol Ratio 11/07/2023 25.7  20.0 - 50.0 % Final    Total Cholesterol/HDL Ratio 11/07/2023 3.9  2.0 - 5.0 Final    Non-HDL Cholesterol 11/07/2023 110  mg/dL Final    Comment: Risk category and Non-HDL cholesterol goals:  Coronary heart disease (CHD)or equivalent (10-year risk of CHD >20%):  Non-HDL cholesterol goal     <130 mg/dL  Two or more CHD risk factors and 10-year risk of CHD <= 20%:  Non-HDL cholesterol goal     <160 mg/dL  0 to 1 CHD risk factor:  Non-HDL cholesterol goal     <190 mg/dL      Prothrombin Time 11/07/2023 21.3 (H)  9.0 - 12.5 sec Final    INR 11/07/2023 1.9 (H)  0.8 - 1.2 Final    Comment: Coumadin Therapeutic Range:  Thrombosis: 2.0-3.0   Most mechanical heart valves or recurrent embolism: 2.5 - 3.5     For vitamin K antagonists (eg. Warfarin or Coumadin), the PT/INR is   recommended.    Direct oral anticoagulant medications (non-vitamin K) must not be   monitored with PT/INR or aPTT since the effect of these tests is not  predictable.      TSH 11/07/2023 43.821 (H)  0.400 - 4.000 uIU/mL Final    Free T4 11/07/2023 1.29  0.71 - 1.51 ng/dL Final       Lab Results   Component Value Date    INR 1.9 (H) 11/07/2023    INR 1.66 11/03/2023    INR 1.69 10/24/2023       No results found for: "SMOOTHMUSCAB", "MITOAB"  Lab Results   Component Value Date    IRON 117 03/28/2023    TIBC 311 03/28/2023 " "   FERRITIN 63 03/28/2023     Lab Results   Component Value Date    HEPAIGM Non-reactive 10/16/2023    HEPBIGM Non-reactive 10/16/2023    HEPCAB Non-reactive 10/16/2023     Lab Results   Component Value Date    TSH 43.821 (H) 11/07/2023     No results found for: "WENDY"    No results found for: "ABORH"        Lab Results   Component Value Date    HGBA1C 4.2 01/05/2023     Lab Results   Component Value Date    CHOL 148 11/07/2023    CHOL 181 01/05/2023     Lab Results   Component Value Date    HDL 38 (L) 11/07/2023    HDL 35 (L) 01/05/2023     Lab Results   Component Value Date    LDLCALC 93 11/07/2023    LDLCALC 95 01/05/2023     Lab Results   Component Value Date    TRIG 78 11/07/2023    TRIG 220 (H) 01/05/2023     Lab Results   Component Value Date    CHOLHDL 25.7 11/07/2023    CHOLHDL 19.3 (L) 01/05/2023         I personally reviewed imaging studies and outside records..      Assessment:       1. Cirrhosis of liver with ascites, unspecified hepatic cirrhosis type    2. Secondary esophageal varices with bleeding    3. Hepatic encephalopathy    4. Other ascites    5. Portal hypertension    6. Non-alcoholic fatty liver disease    7. Thrombocytopenia    8. Coagulopathy    9. Hypothyroidism, unspecified type                   Plan:     Diagnoses and all orders for this visit:    Cirrhosis of liver with ascites, unspecified hepatic cirrhosis type    Secondary esophageal varices with bleeding    Hepatic encephalopathy    Other ascites    Portal hypertension    Non-alcoholic fatty liver disease    Thrombocytopenia    Coagulopathy    Hypothyroidism, unspecified type                --Cirrhosis    Liver disease:                   DALEY    MELD-Na:                         20  Child-Villegas Class:             C    Cirrhosis is decompensated with:    Ascites:                                                      yes  Spontaneous bacterial peritonitis:           ?  Hepatic Encephalopathy:                           yes  Portal " bleeding:                                          yes  Hepatocellular carcinoma:                          no    Hepatorenal syndrome:                              no  Hyponatremia:                                            no  Muscle wasting:                                          yes   Portal vein thrombosis:                               yes      Transplant status:             not under evaluation  Complex surgical vascular anatomy hence patient declined for transplant evaluation at [facility]       --Ascites/Edema: 2 gm Na diet. Continue Lasix 80 mg daily and Aldactone 200 mg daily. Titrate the diuretics. Discuss TIPS at [facility]. Continue LVP locally    - Encephalopathy: Continue lactulose. Titrate to maintain 3-4 bowel movements per day.. Add rifaximin        - Varices: EGD surveillance as per GI recommendation. Beta blockers yes    -Cirrhosis Care    - HCC screening:  Abdominal US and AFP for HCC surveillance every 6 months.  - Immunizations: Recommend HAV and HBV vaccinations if not immune.  - Dexa Scan every 2-3 years    Rest Health Maintenance as per Primary care Physician. Follow up Neurology for Stroke history    Return to clinic in 6 months. Follow up [facility].     I have sent communication to the referring physician and/or primary care provider.      Time Statement  A total time spent includes time preparing to see patient, reviewing  diagnostic studies and records, direct face-to-face visit, completing orders, medications , reconciliation, prescription management, and care coordination    We discussed in depth the nature of the patient's disease, the management plan in details. I have provided the patient with an opportunity to ask questions and have all questions answered to his satisfaction.     Discussed with patient that it is likely that she will see results before Myself or my nurse are able to view them and report results due to the Cures Act passed 4/1/21. Results will be sent immediately to the patient  who are enrolled in the patient portal. If results come through after business hours or on weekend, we will not see them until the next business day that we are in the office. If resulted during the business day, we will likely not be able to review them until after completing all patient visits in office that day.   Patient was seen in the liver transplant department at [facility] Debbie Fu MD  Transplant Hepatologist and Gastroenterologist  [facility] [place]

## 2023-11-15 ENCOUNTER — OFFICE VISIT (OUTPATIENT)
Dept: HEPATOLOGY | Facility: CLINIC | Age: 58
End: 2023-11-15
Payer: MEDICAID

## 2023-11-15 DIAGNOSIS — D69.6 THROMBOCYTOPENIA: ICD-10-CM

## 2023-11-15 DIAGNOSIS — E03.9 HYPOTHYROIDISM, UNSPECIFIED TYPE: ICD-10-CM

## 2023-11-15 DIAGNOSIS — I85.11 SECONDARY ESOPHAGEAL VARICES WITH BLEEDING: ICD-10-CM

## 2023-11-15 DIAGNOSIS — R18.8 CIRRHOSIS OF LIVER WITH ASCITES, UNSPECIFIED HEPATIC CIRRHOSIS TYPE: Primary | ICD-10-CM

## 2023-11-15 DIAGNOSIS — K76.0 NON-ALCOHOLIC FATTY LIVER DISEASE: ICD-10-CM

## 2023-11-15 DIAGNOSIS — K74.60 CIRRHOSIS OF LIVER WITH ASCITES, UNSPECIFIED HEPATIC CIRRHOSIS TYPE: Primary | ICD-10-CM

## 2023-11-15 DIAGNOSIS — R18.8 OTHER ASCITES: ICD-10-CM

## 2023-11-15 DIAGNOSIS — K76.82 HEPATIC ENCEPHALOPATHY: ICD-10-CM

## 2023-11-15 DIAGNOSIS — K76.6 PORTAL HYPERTENSION: ICD-10-CM

## 2023-11-15 DIAGNOSIS — D68.9 COAGULOPATHY: ICD-10-CM

## 2023-11-15 PROCEDURE — 1160F RVW MEDS BY RX/DR IN RCRD: CPT | Mod: CPTII,95,, | Performed by: INTERNAL MEDICINE

## 2023-11-15 PROCEDURE — 1159F MED LIST DOCD IN RCRD: CPT | Mod: CPTII,95,, | Performed by: INTERNAL MEDICINE

## 2023-11-15 PROCEDURE — 99215 OFFICE O/P EST HI 40 MIN: CPT | Mod: 95,,, | Performed by: INTERNAL MEDICINE

## 2023-11-15 PROCEDURE — 1160F PR REVIEW ALL MEDS BY PRESCRIBER/CLIN PHARMACIST DOCUMENTED: ICD-10-PCS | Mod: CPTII,95,, | Performed by: INTERNAL MEDICINE

## 2023-11-15 PROCEDURE — 99215 PR OFFICE/OUTPT VISIT, EST, LEVL V, 40-54 MIN: ICD-10-PCS | Mod: 95,,, | Performed by: INTERNAL MEDICINE

## 2023-11-15 PROCEDURE — 1111F DSCHRG MED/CURRENT MED MERGE: CPT | Mod: CPTII,95,, | Performed by: INTERNAL MEDICINE

## 2023-11-15 PROCEDURE — 1159F PR MEDICATION LIST DOCUMENTED IN MEDICAL RECORD: ICD-10-PCS | Mod: CPTII,95,, | Performed by: INTERNAL MEDICINE

## 2023-11-15 PROCEDURE — 3044F PR MOST RECENT HEMOGLOBIN A1C LEVEL <7.0%: ICD-10-PCS | Mod: CPTII,95,, | Performed by: INTERNAL MEDICINE

## 2023-11-15 PROCEDURE — 1111F PR DISCHARGE MEDS RECONCILED W/ CURRENT OUTPATIENT MED LIST: ICD-10-PCS | Mod: CPTII,95,, | Performed by: INTERNAL MEDICINE

## 2023-11-15 PROCEDURE — 3044F HG A1C LEVEL LT 7.0%: CPT | Mod: CPTII,95,, | Performed by: INTERNAL MEDICINE

## 2023-11-15 NOTE — PATIENT INSTRUCTIONS
Because you have cirrhosis, it is important to attend regular clinic visits  with an Ultrasound and blood tests every 6 months to screen for liver cancer (you are at risk of developing liver cancer due to scar tissue in the liver)     Signs and symptoms of worsening liver disease include jaundice, fluid in the belly (ascites), and confusion/disorientation/slowed thought processes due to hepatic encephalopathy (toxins building up because of liver problems).   You should seek medical attention if any of these things occur.    Also, possible bleeding from esophageal varices (blood vessels in the stomach and foodpipe can burst and cause fatal bleeding).  Therefore, if you have symptoms of vomiting blood, blood in your stool, dark or black stools or vomiting coffee ground vomit, YOU SHOULD GO TO THE EMERGENCY ROOM IMMEDIATELY.       Cirrhosis Counseling  - strict abstinence of alcohol use  - compliance with lactulose and rifaximin if you have developed hepatic encephalopathy (confusion due to high ammonia level)  - avoid non-steroidal anti-inflammatory drugs (NSAIDs) such as ibuprofen, Motrin, naprosyn, Alleve due to the risk of kidney damage  - can take acetaminophen (Tylenol), no more than 2000 mg per day  - low sodium (salt) 2 gram per day diet  - nutrition: 25-30kcal (calorie per body body weight in kilogram) per day  - no need to restrict protein in diet  - high protein diet: 1.2-1.5 gram/kg (protein per body weight in kilogram) per day to prevent muscle mass loss  - avoid fasting  -I recommend you do NOT drive a car or operate machinery currently considering risk of Hepatic encephalopathy  - Late night snack with 50 gram of complex carbohydrates and protein  - resistance exercises for muscle strength  - avoid raw seafoods due to the risk of fatal Vibrio vulnificus infection  - ultrasound or imaging of the liver every 6 months for liver cancer screening (you are at risk of developing liver cancer due to scar tissue  in the liver)  - Upper endoscopy every 1-2 years to screen for varices in the stomach and foodpipe which can burst and cause fatal bleeding

## 2023-11-17 ENCOUNTER — TELEPHONE (OUTPATIENT)
Dept: HEPATOLOGY | Facility: CLINIC | Age: 58
End: 2023-11-17
Payer: MEDICAID

## 2023-12-28 PROBLEM — K76.0 NON-ALCOHOLIC FATTY LIVER DISEASE: Status: RESOLVED | Noted: 2023-11-07 | Resolved: 2023-12-28

## 2024-02-08 PROBLEM — K72.90 DECOMPENSATED HEPATIC CIRRHOSIS: Status: RESOLVED | Noted: 2023-10-17 | Resolved: 2024-02-08

## 2024-02-08 PROBLEM — R22.41 LOCALIZED SWELLING OF RIGHT FOOT: Status: RESOLVED | Noted: 2023-01-05 | Resolved: 2024-02-08

## 2024-02-08 PROBLEM — K75.81 LIVER CIRRHOSIS SECONDARY TO NASH: Status: ACTIVE | Noted: 2024-02-08

## 2024-02-08 PROBLEM — K74.60 LIVER CIRRHOSIS SECONDARY TO NASH: Status: ACTIVE | Noted: 2024-02-08

## 2024-02-08 PROBLEM — I85.10 SECONDARY ESOPHAGEAL VARICES WITHOUT BLEEDING: Status: ACTIVE | Noted: 2019-10-15

## 2024-02-08 PROBLEM — R18.8 OTHER ASCITES: Status: RESOLVED | Noted: 2023-10-17 | Resolved: 2024-02-08

## 2024-02-08 PROBLEM — K74.60 DECOMPENSATED HEPATIC CIRRHOSIS: Status: RESOLVED | Noted: 2023-10-17 | Resolved: 2024-02-08

## 2024-03-11 ENCOUNTER — NURSE TRIAGE (OUTPATIENT)
Dept: ADMINISTRATIVE | Facility: CLINIC | Age: 59
End: 2024-03-11
Payer: MEDICAID

## 2024-03-11 NOTE — TELEPHONE ENCOUNTER
Pt calling with C/o covid + and she said that she has a really bad headache and she took 2 tylenol and she can't take too much since she has issues with her liver. Pt said that her sister lives with her and said that she has been more disoriented than normal. 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 Pt triaged and care advice is to hang up and call 911 and said that she will as she has no one to take her to the hospital. Pt told to call back once back home if any other questions or concerns or any other needs. Pt said that she reached out to provider and hasn't heard back yet.                       Reason for Disposition   Difficult to awaken or acting confused (e.g., disoriented, slurred speech)    Additional Information   Negative: SEVERE difficulty breathing (e.g., struggling for each breath, speaks in single words)    Protocols used: Coronavirus (COVID-19) Diagnosed or [id number]

## 2024-04-20 PROBLEM — K76.7 HEPATORENAL SYNDROME: Status: RESOLVED | Noted: 2024-04-20 | Resolved: 2024-04-20

## 2024-04-20 PROBLEM — K76.7 HEPATORENAL SYNDROME: Status: ACTIVE | Noted: 2024-04-20

## 2024-04-25 PROBLEM — E87.70 HYPERVOLEMIA: Status: ACTIVE | Noted: 2024-04-25

## 2024-04-26 PROBLEM — R60.1 ANASARCA: Status: ACTIVE | Noted: 2024-04-26

## 2024-05-02 PROBLEM — R32 URINARY INCONTINENCE IN FEMALE: Status: ACTIVE | Noted: 2024-05-02

## 2024-05-08 ENCOUNTER — TELEPHONE (OUTPATIENT)
Dept: TRANSPLANT | Facility: CLINIC | Age: 59
End: 2024-05-08
Payer: MEDICAID

## 2024-05-08 NOTE — TELEPHONE ENCOUNTER
Liver Transplant Committee Discussion     Patient Name: Patricia Hutchinson   : 1965  MRN: [id number]    Requested by: Brendan Fu MD    Day to be discussed: Liver discussion days: Wednesday     Transplant Coordinator: Liver Coordinators: Swati Patiño    Patient Status: outpatient    Transplant Status: transplant status: Other    Reason for Discussion: pt referred by Dr Fu for liver txp eval. Pt was denied before 10/31/2023 due to:   Added by:   rBendan Fu MD     Case discussed In IR conference. Patient is not a candidate for Liver Transplant due to complex surgical anatomy and extensive thrombosis of Portal system. No plan to initial the transplant evaluation. Unfortunately patient need to continue LVP to control fluid accumulation         Plan:    Route to:Nickie/Swati

## 2024-05-15 ENCOUNTER — CONFERENCE (OUTPATIENT)
Dept: TRANSPLANT | Facility: CLINIC | Age: 59
End: 2024-05-15
Payer: MEDICAID

## 2024-05-15 NOTE — TELEPHONE ENCOUNTER
Liver Transplant Committee Discussion      Patient Name: Patricia Hutchinson   : 1965  MRN: [id number]     Requested by: Brendan Fu MD     Day to be discussed: Liver discussion days: Wednesday      Transplant Coordinator: Liver Coordinators: Swati Patiño     Patient Status: outpatient     Transplant Status: transplant status: Other     Reason for Discussion: pt referred by Dr Fu for liver txp eval. Pt was denied before 10/31/2023 due to:   Added by:   Brendan Fu MD     Case discussed In IR conference. Patient is not a candidate for Liver Transplant due to complex surgical anatomy and extensive thrombosis of Portal system. No plan to initial the transplant evaluation. Unfortunately patient need to continue LVP to control fluid accumulation           Plan: Case discussed In liver transplant committee. Patient is not a candidate for Liver Transplant due to complex surgical anatomy and extensive thrombosis of Portal system. Risk of surgery outweighs benefits. Transplant referral will be closed.      Route to:Nickie/Swati

## 2024-06-05 PROBLEM — K74.60 CIRRHOSIS OF LIVER WITH ASCITES: Status: RESOLVED | Noted: 2023-01-05 | Resolved: 2024-06-05

## 2024-06-05 PROBLEM — R33.8 ACUTE URINARY RETENTION: Status: ACTIVE | Noted: 2024-06-05

## 2024-06-05 PROBLEM — L03.119 CELLULITIS OF LOWER EXTREMITY: Status: ACTIVE | Noted: 2024-06-05

## 2024-06-05 PROBLEM — D70.9 NEUTROPENIA: Status: ACTIVE | Noted: 2024-06-05

## 2024-06-05 PROBLEM — R18.8 CIRRHOSIS OF LIVER WITH ASCITES: Status: RESOLVED | Noted: 2023-01-05 | Resolved: 2024-06-05

## 2024-06-05 PROBLEM — D68.9 COAGULOPATHY: Status: RESOLVED | Noted: 2023-05-08 | Resolved: 2024-06-05

## 2024-06-05 PROBLEM — E87.1 HYPONATREMIA: Status: ACTIVE | Noted: 2024-06-05

## 2024-06-05 PROBLEM — R60.1 ANASARCA: Status: RESOLVED | Noted: 2024-04-26 | Resolved: 2024-06-05

## 2024-06-05 PROBLEM — I81 PORTAL VEIN THROMBOSIS: Status: RESOLVED | Noted: 2023-10-18 | Resolved: 2024-06-05

## 2024-06-06 PROBLEM — I81 PORTAL VEIN THROMBOSIS: Status: ACTIVE | Noted: 2024-06-06

## 2024-06-06 PROBLEM — N17.9 AKI (ACUTE KIDNEY INJURY): Status: ACTIVE | Noted: 2024-06-06

## 2024-06-07 PROBLEM — E87.5 HYPERKALEMIA: Status: ACTIVE | Noted: 2024-06-07

## 2024-06-09 PROBLEM — D70.9 NEUTROPENIA: Status: RESOLVED | Noted: 2024-06-05 | Resolved: 2024-06-09

## 2024-06-11 PROBLEM — D64.9 NORMOCYTIC ANEMIA: Status: ACTIVE | Noted: 2024-06-11
